# Patient Record
Sex: MALE | Race: WHITE | URBAN - METROPOLITAN AREA
[De-identification: names, ages, dates, MRNs, and addresses within clinical notes are randomized per-mention and may not be internally consistent; named-entity substitution may affect disease eponyms.]

---

## 2021-05-29 ENCOUNTER — RECORDS - HEALTHEAST (OUTPATIENT)
Dept: ADMINISTRATIVE | Facility: CLINIC | Age: 46
End: 2021-05-29

## 2025-04-30 ENCOUNTER — HOSPITAL ENCOUNTER (EMERGENCY)
Facility: CLINIC | Age: 50
Discharge: HOME | End: 2025-05-01
Attending: EMERGENCY MEDICINE | Admitting: EMERGENCY MEDICINE
Payer: COMMERCIAL

## 2025-04-30 ENCOUNTER — APPOINTMENT (OUTPATIENT)
Dept: RADIOLOGY | Facility: CLINIC | Age: 50
End: 2025-04-30
Attending: EMERGENCY MEDICINE
Payer: COMMERCIAL

## 2025-04-30 DIAGNOSIS — S73.035A ANTERIOR DISLOCATION OF LEFT HIP, INITIAL ENCOUNTER (H): ICD-10-CM

## 2025-04-30 PROCEDURE — 999N000065 XR HIP LEFT 2-3 VIEWS

## 2025-04-30 PROCEDURE — 99152 MOD SED SAME PHYS/QHP 5/>YRS: CPT | Mod: 59

## 2025-04-30 PROCEDURE — 250N000011 HC RX IP 250 OP 636: Mod: JZ | Performed by: EMERGENCY MEDICINE

## 2025-04-30 PROCEDURE — 258N000003 HC RX IP 258 OP 636: Performed by: EMERGENCY MEDICINE

## 2025-04-30 PROCEDURE — 99153 MOD SED SAME PHYS/QHP EA: CPT | Mod: 59

## 2025-04-30 PROCEDURE — 99153 MOD SED SAME PHYS/QHP EA: CPT

## 2025-04-30 PROCEDURE — 27250 TREAT HIP DISLOCATION: CPT | Mod: LT

## 2025-04-30 PROCEDURE — 999N000157 HC STATISTIC RCP TIME EA 10 MIN

## 2025-04-30 PROCEDURE — 96376 TX/PRO/DX INJ SAME DRUG ADON: CPT

## 2025-04-30 PROCEDURE — 99285 EMERGENCY DEPT VISIT HI MDM: CPT | Mod: 25

## 2025-04-30 PROCEDURE — 96374 THER/PROPH/DIAG INJ IV PUSH: CPT

## 2025-04-30 PROCEDURE — 96375 TX/PRO/DX INJ NEW DRUG ADDON: CPT

## 2025-04-30 PROCEDURE — 73502 X-RAY EXAM HIP UNI 2-3 VIEWS: CPT

## 2025-04-30 RX ORDER — ONDANSETRON 4 MG/1
4 TABLET, ORALLY DISINTEGRATING ORAL EVERY 8 HOURS PRN
Qty: 15 TABLET | Refills: 0 | Status: SHIPPED | OUTPATIENT
Start: 2025-04-30

## 2025-04-30 RX ORDER — HYDROCODONE BITARTRATE AND ACETAMINOPHEN 5; 325 MG/1; MG/1
1 TABLET ORAL EVERY 6 HOURS PRN
Qty: 15 TABLET | Refills: 0 | Status: SHIPPED | OUTPATIENT
Start: 2025-04-30

## 2025-04-30 RX ORDER — ONDANSETRON 2 MG/ML
4 INJECTION INTRAMUSCULAR; INTRAVENOUS ONCE
Status: COMPLETED | OUTPATIENT
Start: 2025-04-30 | End: 2025-04-30

## 2025-04-30 RX ORDER — PROPOFOL 10 MG/ML
INJECTION, EMULSION INTRAVENOUS
Status: DISCONTINUED
Start: 2025-04-30 | End: 2025-04-30 | Stop reason: WASHOUT

## 2025-04-30 RX ORDER — FENTANYL CITRATE 50 UG/ML
100 INJECTION, SOLUTION INTRAMUSCULAR; INTRAVENOUS ONCE
Status: COMPLETED | OUTPATIENT
Start: 2025-04-30 | End: 2025-04-30

## 2025-04-30 RX ORDER — PROPOFOL 10 MG/ML
150 INJECTION, EMULSION INTRAVENOUS
Status: COMPLETED | OUTPATIENT
Start: 2025-04-30 | End: 2025-04-30

## 2025-04-30 RX ORDER — FENTANYL CITRATE 50 UG/ML
50 INJECTION, SOLUTION INTRAMUSCULAR; INTRAVENOUS
Status: COMPLETED | OUTPATIENT
Start: 2025-04-30 | End: 2025-04-30

## 2025-04-30 RX ADMIN — FENTANYL CITRATE 50 MCG: 50 INJECTION INTRAMUSCULAR; INTRAVENOUS at 22:04

## 2025-04-30 RX ADMIN — FENTANYL CITRATE 100 MCG: 50 INJECTION INTRAMUSCULAR; INTRAVENOUS at 21:30

## 2025-04-30 RX ADMIN — PROPOFOL 40 MG: 10 INJECTION, EMULSION INTRAVENOUS at 23:17

## 2025-04-30 RX ADMIN — FENTANYL CITRATE 50 MCG: 50 INJECTION INTRAMUSCULAR; INTRAVENOUS at 22:50

## 2025-04-30 RX ADMIN — PROPOFOL 80 MG: 10 INJECTION, EMULSION INTRAVENOUS at 23:11

## 2025-04-30 RX ADMIN — SODIUM CHLORIDE 1000 ML: 0.9 INJECTION, SOLUTION INTRAVENOUS at 22:51

## 2025-04-30 RX ADMIN — PROPOFOL 40 MG: 10 INJECTION, EMULSION INTRAVENOUS at 23:14

## 2025-04-30 RX ADMIN — ONDANSETRON 4 MG: 2 INJECTION, SOLUTION INTRAMUSCULAR; INTRAVENOUS at 21:30

## 2025-04-30 RX ADMIN — PROPOFOL 40 MG: 10 INJECTION, EMULSION INTRAVENOUS at 23:12

## 2025-04-30 ASSESSMENT — ACTIVITIES OF DAILY LIVING (ADL)
ADLS_ACUITY_SCORE: 41
ADLS_ACUITY_SCORE: 41

## 2025-05-01 VITALS
DIASTOLIC BLOOD PRESSURE: 82 MMHG | HEART RATE: 63 BPM | SYSTOLIC BLOOD PRESSURE: 138 MMHG | BODY MASS INDEX: 27.47 KG/M2 | WEIGHT: 175 LBS | TEMPERATURE: 98.2 F | HEIGHT: 67 IN | OXYGEN SATURATION: 98 % | RESPIRATION RATE: 24 BRPM

## 2025-05-01 NOTE — ED NOTES
EMERGENCY DEPARTMENT SIGN OUT NOTE        ED COURSE AND MEDICAL DECISION MAKING  Patient was signed out to me by Dr Oxana Sanchez at 11:23 PM.  11:59 PM I personally reviewed and interpreted the post-reduction XR and hip is reduced.     In brief, Gabriel Ramirez is a 50 year old male who initially presented with hip pain.     At time of sign out, disposition was pending post reduction XR.    Post reduction x-ray shows hip in good position.  No fractures.  Will give crutches with toe-touch weightbearing for the next few days.  Will have him follow-up with orthopedics.  Return for any worsening symptoms.  CMS intact.      FINAL IMPRESSION    1. Anterior dislocation of left hip, initial encounter (H)        ED MEDS  Medications   propofol (DIPRIVAN) 200 MG/20ML injection (has no administration in time range)   fentaNYL (PF) (SUBLIMAZE) injection 100 mcg (100 mcg Intravenous $Given 4/30/25 2130)   ondansetron (ZOFRAN) injection 4 mg (4 mg Intravenous $Given 4/30/25 2130)   fentaNYL (PF) (SUBLIMAZE) injection 50 mcg (50 mcg Intravenous $Given 4/30/25 2250)   propofol (DIPRIVAN) injection 10 mg/mL vial (40 mg Intravenous $Given 4/30/25 2317)   sodium chloride 0.9% BOLUS 1,000 mL (1,000 mLs Intravenous $New Bag 4/30/25 2251)       LAB  Labs Ordered and Resulted from Time of ED Arrival to Time of ED Departure - No data to display        RADIOLOGY    XR Pelvis and Hip Left 2 Views   Final Result   IMPRESSION: Anterior dislocation left hip. No fracture visible.      XR Hip Left 2-3 Views    (Results Pending)       DISCHARGE MEDS  New Prescriptions    HYDROCODONE-ACETAMINOPHEN (NORCO) 5-325 MG TABLET    Take 1 tablet by mouth every 6 hours as needed for severe pain.    ONDANSETRON (ZOFRAN ODT) 4 MG ODT TAB    Take 1 tablet (4 mg) by mouth every 8 hours as needed for vomiting or nausea.       Dr. Adrian Max  [facility] EMERGENCY ROOM  [address] [id number] [phone]          Domo Max MD  05/01/25 0004

## 2025-05-01 NOTE — ED NOTES
Bed: WW-10  Expected date: 4/30/25  Expected time: 9:43 PM  Means of arrival:   Comments:  (RV)Dislocated hip - when room is clean

## 2025-05-01 NOTE — ED PROVIDER NOTES
EMERGENCY DEPARTMENT ENCOUNTER      NAME: Gabriel Ramirez  AGE: 50 year old male  YOB: 1975  MRN: [id number]  EVALUATION DATE & TIME: No admission date for patient encounter.    PCP: Twila Yanes    ED PROVIDER: Oxana Sanchez M.D.      Chief Complaint   Patient presents with    Hip Pain    Leg Pain       FINAL IMPRESSION:  1. Anterior dislocation of left hip, initial encounter (H)        ED COURSE & MEDICAL DECISION MAKIN. L sided hip pain, s/p soccer injury.  No previous surgeries, no other pain or injuries (no head trauma etc).  Initial pelvis and left hip XR show anterior hip dislocation, no fracture.  Left hip reduction performed at bedside with moderate sedation (propofol).  Post reduction Po challenge and XR signed out to Dr. Max, oncoming ED physician.    9:18 PM I met with the patient in triage to gather history and to perform my initial exam. I discussed the plan for care while in the Emergency Department.       Pertinent Labs & Imaging studies reviewed. (See chart for details).      Medical Decision Making  I obtained history from patient and friend, I reviewed the EMR as documented in HPI, ED course, and chart review section above and below, Care impacted by chronic conditions/past medical history as documented in HPI, ED course, and chart review section above below, I independently interpreted Radiological studies as documented in Radiology section below. See radiology report for final interpretation., and I discussed the care with another health care provider: Dr. Max    Care transferred to Dr. Max for post procedure evaluation and post reduction XR visualization/read    MIPS (CTPE, Dental pain, Vazquez, Sinusitis, Asthma/COPD, Head Trauma): Not Applicable    SEPSIS: None    At the conclusion of the encounter I discussed the results of all of the tests and the disposition. The questions were answered. The patient or family acknowledged understanding and was agreeable with the  care plan.      CRITICAL CARE:  N/A    Lists of [facility] in the [place]    Patient information was obtained from: patient and friend.    Use of : N/A.       Gabriel Ramirez is a 50 year old male with a pertinent history of hyperlipidemia who presents to the ED by walk-in with a friend for evaluation of hip pain and leg pain.     Approximately 1.5 hours prior to arrival, patient was playing soccer and went for a ball (left hip and left knee were flexed). While doing this, his left foot got caught in the ground and his body kept going forward. This immediately caused left hip pain and numbness. He also reports intermittent tremors since this incident. Due to his worsening pain, patient presents to the ED for further evaluation.     Denies any fevers, nausea, vomiting, or headache. No head trauma or lost of consciousness. No history of hip surgery. Not on any pain medications.     Per Chart Review: Office visit on 4/3/2025 at [facility].      REVIEW OF SYSTEMS  All other systems negative unless noted in HPI.    PAST MEDICAL HISTORY:  No past medical history on file.    PAST SURGICAL HISTORY:  No past surgical history on file.      CURRENT MEDICATIONS:    No current facility-administered medications for this encounter.     Current Outpatient Medications   Medication Sig Dispense Refill    HYDROcodone-acetaminophen (NORCO) 5-325 MG tablet Take 1 tablet by mouth every 6 hours as needed for severe pain. 15 tablet 0    ondansetron (ZOFRAN ODT) 4 MG ODT tab Take 1 tablet (4 mg) by mouth every 8 hours as needed for vomiting or nausea. 15 tablet 0         ALLERGIES:  No Known Allergies    FAMILY HISTORY:  No family history on file.    SOCIAL HISTORY:  Social History     Socioeconomic History    Marital status:      Social Drivers of Health     Financial Resource Strain: Low Risk  (7/10/2023)    Received from [facility]    Financial Resource Strain     Difficulty of Paying Living Expenses:  "3   Food Insecurity: No Food Insecurity (7/10/2023)    Received from [facility]    Food Insecurity     Worried About Running Out of Food in the Last Year: 1   Transportation Needs: No Transportation Needs (7/10/2023)    Received from [facility]    Transportation Needs     Lack of Transportation (Medical): 1   Social Connections: Socially Integrated (7/10/2023)    Received from [facility]    Social Connections     Frequency of Communication with Friends and Family: 0   Housing Stability: Low Risk  (7/10/2023)    Received from [facility]    Housing Stability     Unable to Pay for Housing in the Last Year: 1       VITALS:  Patient Vitals for the past 24 hrs:   BP Temp Temp src Pulse Resp SpO2 Height Weight   04/30/25 2346 (!) 141/81 -- -- 69 17 98 % -- --   04/30/25 2332 130/62 -- -- 60 15 98 % -- --   04/30/25 2331 130/62 -- -- 63 13 99 % -- --   04/30/25 2328 (!) 165/74 -- -- 62 12 99 % -- --   04/30/25 2321 (!) 152/76 -- -- 57 17 98 % -- --   04/30/25 2318 (!) 152/70 -- -- 70 12 100 % -- --   04/30/25 2315 (!) 152/60 -- -- 75 24 96 % -- --   04/30/25 2313 (!) 151/92 -- -- 77 20 96 % -- --   04/30/25 2307 (!) 146/82 -- -- 61 17 100 % -- --   04/30/25 2215 129/76 -- -- 60 15 98 % -- --   04/30/25 2210 (!) 144/72 -- -- 65 12 98 % -- --   04/30/25 2200 (!) 151/87 98.2  F (36.8  C) Oral 67 18 99 % -- --   04/30/25 2124 137/80 97.3  F (36.3  C) Temporal 60 18 99 % 1.702 m (5' 7\") 79.4 kg (175 lb)       PHYSICAL EXAM    VITAL SIGNS: BP (!) 141/81   Pulse 69   Temp 98.2  F (36.8  C) (Oral)   Resp 17   Ht 1.702 m (5' 7\")   Wt 79.4 kg (175 lb)   SpO2 98%   BMI 27.41 kg/m    Physical Exam  Constitutional:       General: He is in acute distress.      Appearance: Normal appearance.   Eyes:      General:         Right eye: No discharge.         Left eye: No discharge.      " Conjunctiva/sclera: Conjunctivae normal.   Cardiovascular:      Rate and Rhythm: Normal rate.   Pulmonary:      Effort: Pulmonary effort is normal. No respiratory distress.   Musculoskeletal:      Cervical back: Normal range of motion. No rigidity.      Comments: Left leg held at slight hip flexion, external rotation and shortening of left leg. 2+ DP pulse present. No knee effusion or pain to palpation. Anterior/lateral hip soft tissue swelling without open skin or contusion.    Skin:     General: Skin is warm and dry.   Neurological:      General: No focal deficit present.      Mental Status: He is alert. Mental status is at baseline.   Psychiatric:         Mood and Affect: Mood normal.         Behavior: Behavior normal.         LABS  Labs Ordered and Resulted from Time of ED Arrival to Time of ED Departure - No data to display      RADIOLOGY  XR Pelvis and Hip Left 2 Views   Final Result   IMPRESSION: Anterior dislocation left hip. No fracture visible.      XR Hip Left 2-3 Views    (Results Pending)      I have independently reviewed the above image. See radiology report for detail.      EKG:    NA       PROCEDURES:  PROCEDURE: Procedural Sedation  Orthopedic Injury Reduction   INDICATIONS: Sedation is required to allow for joint reduction (L hip)   SEDATION PROVIDER: Dr Oxana Sanchez   PROCEDURE PROVIDER: Dr Oxana Sanchez   LEVEL OF SEDATION: Moderate Sedation    Defined as:  Minimal = Normal response to verbal  Moderate = Responds to verbal and light tactile stimulation  Deep = Responds after repeated painful stimulation   CONSENT: Risks, benefits and alternatives were discussed with and Written consent was obtained from Patient.   PROCEDURE SPECIFIC CHECKLIST COMPLETED:   Yes   LAST ORAL INTAKE: Clear Liquids >2 hours, Full Liquids > 4 hours, Light Meal > 6 hours, and Regular Meal > 8 hours   ASA CLASS: 1 - Healthy patient, no medical problems   MALLAMPATI:  I - Faucial pillars, soft palate, and uvula are visible    TIME OUT: Universal protocol was followed. TIME OUT conducted just prior to starting procedure confirmed patient identity, site/side, procedure, patient position, and availability of correct equipment. Yes    Immediately prior to initiation of sedation, reassessment of clinical condition was performed which was unchanged.   MEDICATIONS: Propofol, 200 mg, IV   MONITORING: Monitoring consisted of:   heart rate, cardiac monitor, continuous pulse oximeter, continuous capnometry (end tidal CO2), frequent blood pressure checks, level of consciousness checks, IV access, constant attendance by RN until patient is recovered, and constant attendance by MD until patient is stable   RESPONSE: vital signs stable, airway patent, and O2 saturations remained >92%   REDUCTION   PROCEDURE DESCRIPTION: ORTHOPEDIC MANAGEMENT:  Bone/Joint Manipulation: Affected extremity was grasped and gradual traction was applied and was further manipulated manually until alignment and positioning were improved.      POST-SEDATION ASSESSMENT/NOTE: Lowest level oxygen saturation reached was 93%.    Post procedure patient was alert and responds to verbal stimuli    Patient was monitored during recovery and returned to pre-procedure baseline.   ADDITIONAL MD ASSISTANCE: Dr. Max   TOTAL MD DRUG ADMINISTRATION / MONITORING TIME: 30 minutes   COMPLICATIONS:  Patient tolerated procedure well, without complication          MEDICATIONS GIVEN IN THE EMERGENCY:  Medications   fentaNYL (PF) (SUBLIMAZE) injection 100 mcg (100 mcg Intravenous $Given 4/30/25 2130)   ondansetron (ZOFRAN) injection 4 mg (4 mg Intravenous $Given 4/30/25 2130)   fentaNYL (PF) (SUBLIMAZE) injection 50 mcg (50 mcg Intravenous $Given 4/30/25 2250)   propofol (DIPRIVAN) injection 10 mg/mL vial (40 mg Intravenous $Given 4/30/25 2317)   sodium chloride 0.9% BOLUS 1,000 mL (1,000 mLs Intravenous $New Bag 4/30/25 2251)       NEW PRESCRIPTIONS STARTED AT TODAY'S ER VISIT  New Prescriptions     HYDROCODONE-ACETAMINOPHEN (NORCO) 5-325 MG TABLET    Take 1 tablet by mouth every 6 hours as needed for severe pain.    ONDANSETRON (ZOFRAN ODT) 4 MG ODT TAB    Take 1 tablet (4 mg) by mouth every 8 hours as needed for vomiting or nausea.        I, Lizet Jeffrey, am serving as a scribe to document services personally performed by Oxana Sanchez MD, based on my observations and the provider's statements to me.  I, Oxana Sanchez MD, attest that Lizet Murphy is acting in a scribe capacity, has observed my performance of the services and has documented them in accordance with my direction.     Oxana Sanchez MD  Emergency Medicine  [facility] EMERGENCY ROOM  [address] [id number] [phone]  Dept: [phone]                Oxana Sanchez MD  05/01/25 0003

## 2025-05-01 NOTE — PROGRESS NOTES
Conscious sedation done. RT present at bedside, monitoring and assess pt. No further intervention needed. Writer exited room, leaving RN in room.    Aislinn Coulter, RT

## 2025-05-01 NOTE — ED TRIAGE NOTES
Sports related injury occurred 1 hr PTA, endorsing L hip and leg pain. Also reporting numbness of the LLE. MD present in triage.      Triage Assessment (Adult)       Row Name 04/30/25 6054          Triage Assessment    Airway WDL WDL        Respiratory WDL    Respiratory WDL WDL        Skin Circulation/Temperature WDL    Skin Circulation/Temperature WDL WDL        Cardiac WDL    Cardiac WDL WDL        Peripheral/Neurovascular WDL    Peripheral Neurovascular WDL X  hip injury, numbness tingling L leg        Cognitive/Neuro/Behavioral WDL    Cognitive/Neuro/Behavioral WDL WDL